# Patient Record
(demographics unavailable — no encounter records)

---

## 2019-10-07 NOTE — PRG
DATE OF SERVICE:  10/07/2019



SUBJECTIVE:  Ms. Eleanor Treadwell is a very pleasant 50-year-old, who presents to the

Wound Center for evaluation of an ulceration of the plantar surface of the right

midfoot.  The patient also has an ulceration over the right medial ankle.  The

patient previously stated that she had been followed by Dr. Carroll for the ulceration

over the plantar surface of the right midfoot.  The patient's medical history is

significant for Charcot arthropathy.  The patient stated that she was prescribed a

boot, which may have led to the formation of an abscess over the right medial ankle.

 The patient stated that she was admitted to [facility] on

08/11/2019 for treatment of erythema and edema of her right foot.  She stated that

during her hospital stay, the abscess of the right medial ankle was opened at

bedside by Dr. Owens in lieu of Dr. Carroll.  The patient stated that before her

admission to [facility], she had been referred to the [facility].  The patient stated that previously the ulceration of the

plantar surface of the right midfoot had been treated with dressing changes of

Promogran.  After being seen in the Wound Center, the patient was placed on dressing

changes of Xeroform gauze for both right foot ulcerations.  The patient states that

she has been utilizing a knee scooter for offloading of the ulceration of the

plantar surface of the right mid foot.  She states that she also began utilizing an

offloading boot in order to try to prepare for returning to work.  The patient

states she noted a worsening in the appearance of the right medial foot ulceration

after utilizing her offloading boot. 



OBJECTIVE:  VITAL SIGNS:  Temperature 98.3, pulse 74, respirations 16, and blood

pressure 146/65.  Accu-Chek 231. 

EXTREMITIES:  An ulceration over the plantar surface of the right mid foot is

present, which measures approximately 2.4 x 2.0 cm.  The dimensions of the wound at

the time of the patient's last visit were approximately 2.1 x 2.4 cm.  An ulceration

over the right medial ankle is present, which measures approximately 1.1 x 0.8 cm.

The dimensions of the wound at the time of the patient's last visit were

approximately 1.9 x 1.9 cm.  Granulation tissue is present within the margins of the

plantar wound.  Nonviable tissue present within the wound margins was debrided with

an excisional full-thickness debridement with the use of scissors.  Undermining and

desiccated tissue at the periphery of the plantar wound were eliminated with the use

of scissors.  No purulent drainage is associated with the wound.  No erythema of the

skin surrounding the wound is present.  No maceration of the skin of the periwound

is noted.  Bruising of the periwound of the right medial ankle wound is present.  In

addition, the wound bed is dusky on exam today. 



ASSESSMENT AND PLAN:  

1. Ulcerations of right foot as described above.  Dressing changes of Xeroform gauze

followed by 4x4s and Kerlix will be continued on a daily basis after cleansing and

irrigation.  The patient has been told that she may utilize an ACE bandage as needed

at the time of dressing changes.  Again, the patient has been instructed as to the

importance of offloading in achieving the healing of her plantar ulceration.  The

patient states she has a followup appointment with Dr. Carroll tomorrow.  I will see

Ms. Treadwell again in 2 weeks. 

2. Diabetes mellitus.  The patient's Accu-Chek in clinic today is 231.  The patient

has been reminded that for optimal wound healing her blood glucoses should remain

below 150. 

3. Hypertension.

4. Charcot arthropathy.







Job ID:  [id number]

## 2019-10-22 NOTE — MRI
MRI RIGHT HINDFOOT WITH AND WITHOUT IV CONTRAST:

10/22/19

 

PROVIDED CLINICAL HISTORY:  

Diabetic ulcers.

 

FINDINGS: 

Correlation is made with radiographs dated 9/5/19 and prior MRI dated 8/12/19. Neuropathic arthropath
y involving the Chopart joint with plantar dislocation of the talus is redemonstrated. There is patch
y periarticular signal alteration on fluid sensitive sequences and demonstrating corresponding enhanc
ement involving the osseous structures of the hindfoot. 

 

There is no localized signal abnormality within the plantar aspects of the plantarly dislocated talar
 head subjacent to the area of wound at the plantar aspect of the midfoot/hindfoot junction. Similarl
y, there is no focal signal abnormality evident in the medial malleolus subjacent to the area of woun
d. 

 

There is extensive nonenhancing tissue at the plantar aspect of the midfoot/hindfoot junction compati
ble with tissue devitalization. 

 

There is enhancing fluid signal intensity noted at the pseudoarticulation between the anterior aspect
 of the tibia and the adjacent fragmented navicular. This could reflect an infected pseudarthrosis. 

 

There is no additional soft tissue fluid collection evident. There is greater than physiologic fluid 
surrounding the flexor digitorum longus and peroneal tendon with evidence for enhancement involving t
he flexor digitorum longus tendon fluid at the level of the distal tibia. 

 

IMPRESSION: 

1.      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
s previously described. No definite signal abnormality to suggest osteomyelitis. 

2.      Enhancing fluid is interposed the junction of the anterior distal tibia and adjacent fragment
ed navicular, possibly reflecting infected pseudoarthrosis. 

3.      Enhancing flexor digitorum longus tenosynovial fluid that may reflect infectious tenosynoviti
s. 

 

POS: OFF

## 2019-10-27 NOTE — CT
CT LUMBAR SPINE WITHOUT CONTRAST:

 

Date:  10/27/19 

 

HISTORY:  

Low back pain, fall yesterday. 

 

FINDINGS/IMPRESSION: 

Multilevel degenerative changes are present without evidence of acute fracture, subluxation, or pars 
articularis defects. 

 

 

POS: AUDREY

## 2019-11-04 NOTE — HP
REASON FOR ADMISSION:  Right foot osteomyelitis.



HISTORY OF PRESENTING ILLNESS:  The patient gives history of having new sore on 
the

right mid foot area on the plantar aspect from last 1 week.  She had gone to 
see Dr. Roland in Wound Care.  She was asked to go see Dr. Espino.  After seeing Dr. Espino,

the patient was asked to get hospitalized for possible osteomyelitis.  She has 
had

 yellowish discharge from the wound.  She also has had a blister on the right

big toe from last 2 weeks, which is slowly healing per the patient.  No fever at

home.  She has been nonweightbearing on the right foot at home. 



PAST MEDICAL AND SURGICAL HISTORY:  Diabetes mellitus type 2, obesity with BMI 
of

52, tubal ligation, cholecystectomy, left great toe amputation, hypertension,

Charcot joint on the right foot. 



CURRENT MEDICATIONS:  The patient is on;

1. Glipizide 10 mg twice daily.

2. Levemir 30 units subcu twice daily.

3. Lisinopril 5 mg daily.

4. Metformin 1000 mg twice daily.



ALLERGIES:  ALLERGIC TO LATEX.



PERSONAL HISTORY:  Does not abuse alcohol or drugs.  No history of smoking.



FAMILY HISTORY:  Mother  at the age of 47 years, she had motor vehicle 
accident,

she also had history of diabetes.  Father  at the age of 73 years, he has 
had

history of CVA, dysphagia, and PEG tube and had multiple complications from 
stroke. 



CODE STATUS:  Full.



POWER OF :  Her son, Mr. Saxena.



REVIEW OF SYSTEMS:  CONSTITUTIONAL:  Negative for weight loss or gain, ability 
to

conduct usual activities. 

SKIN:  Negative for rash, itching. 

EYES:  Negative for double vision, pain. 

ENT/MOUTH:  Negative for nose bleeding, neck stiffness, pain, tenderness. 

CARDIOVASCULAR:  Negative for palpitations, dyspnea on exertion, orthopnea. 

RESPIRATORY:  Negative for shortness of breath, wheezing, cough, hemoptysis, 
fever

or night sweats. 

GASTROINTESTINAL:  Negative for poor appetite, abdominal pain, heartburn, nausea
,

vomiting, constipation, or diarrhea. 

GENITOURINARY:  Negative for urgency, frequency, dysuria, nocturia. 

MUSCULOSKELETAL:  Negative for pain, swelling. 

NEUROLOGIC/PSYCHIATRIC:  Negative for anxiety, depression. 

ALLERGY/IMMUNOLOGIC:  Negative for skin rash, bleeding tendency.



PHYSICAL EXAMINATION:

GENERAL:  The patient is a 50-year-old female, who is currently not in any acute

distress. 

VITAL SIGNS:  Blood pressure 125/76, pulse 102 per minute, respiratory rate 20 
per

minute, temperature 99.6 degrees Fahrenheit, saturating 95% on room air. 

NECK:  Supple.  No elevated JVD. 

HEENT:  Eyes; extraocular muscles intact.  Pupils are reacting to light.  Oral

cavity, mucous membranes are moist.  No exudates or congestion. 

CARDIOVASCULAR:  S1 and S2 heard.  Regular rhythm. 

RESPIRATORY:  Air entry 2+ bilateral.  No rales or rhonchi. 

ABDOMEN:  Soft.  Bowel sounds heard.  No tenderness, rigidity, or guarding. 

EXTREMITIES:  Right foot on the plantar aspect has a deep ulcer, measuring 3 cm 
x 3

cm.  She also has an ulcer on the right medial foot, measuring 1.5 x 1.5 cm.

Peripheral pulses are 1+ bilateral.  No other ischemic ulcers or gangrene seen. 

CENTRAL NERVOUS SYSTEM:  No gross focal deficits noted.  The patient moves all 4

extremities. 

PSYCHIATRIC:  The patient's mood is euthymic.  No hallucinations or delusions.



LABORATORY DATA:  White count of 10, hemoglobin and hematocrit are 10 and 31,

platelet count is 537, MCV is 80 with 71% neutrophils.  PT, INR, and PTT within

normal limits.  BUN 11, creatinine 1.2, serum glucose 510 on arrival.  Albumin 
is

3.2.  Liver enzymes within normal limits.  .  CRP 5.6.  Sedimentation 
rate is

97.  Chest x-ray done shows no focal consolidation.  X-ray right foot, 3-view 
done,

shows stable appearance of right foot with neuropathic/Charcot joint involving 
the

mid and hindfoot with persistent pes planus deformity and persistent areas of

osseous irregularity. 



CLINICAL IMPRESSION AND PLAN:  The patient will be admitted to medical floor for

right foot ulcers x2 with likely osteomyelitis.  We will obtain consultation 
with

Dr. Espino and also Dr. Carroll, her podiatrist.  Wound cultures will be obtained.
  She

will be placed on cefepime, vancomycin, and Flagyl for now.  The patient has

uncontrolled diabetes and will continue her Glucotrol, metformin, and Lantus.  
Wound

Care will be consulted for the wounds.  We will continue to closely monitor her 
on

medical floor.  The patient appears to have chronic anemia.  We will obtain iron

indices.  We will also obtain arterial Doppler to rule out peripheral vascular

disease with long-standing diabetes. 







Job ID:  [id number]



[facility]

## 2019-11-04 NOTE — RAD
Exam:

XR Foot Rt 3 View STANDARD



HISTORY:

Infection to right foot. Possible osteomyelitis.



COMPARISON:

9/5/2019.



FINDINGS:

As noted on the prior examination, there is subcutaneous emphysema as well as soft tissue swelling se
en involving the medial and plantar aspect of the mid foot. Again noted are findings suggestive of

a Charcot type joint involving the hindfoot and midfoot with pes planus deformity again present. Thes
e findings have not significantly changed when compared to the exam on 9/5/2019. Osseous

irregularity secondary to Charcot joint involving the hindfoot and midfoot is again present. Sclerosi
s of the osseous structures in this region are also present. Given these findings, osteomyelitis

would be difficult to exclude at this site.

No other interval change.



IMPRESSION:



1. Stable appearance of the right foot with neuropathic/Charcot type joint involving the mid and hind
foot with persistent pes planus deformity and persistent areas of osseous irregularity and

sclerosis likely corresponding to Charcot joint. However, given the findings of Charcot type joint, o
steomyelitis would be difficult to exclude based on radiographic evaluation.

2. Subjacent edema and soft tissue swelling at the medial and plantar aspect of the foot likely corre
sponding to patient's known wound in this region. There area of subcutaneous emphysema has

decreased in size compared to the study on 9/5/2019.



Reported By: Jude Bustos 

Electronically Signed:  11/4/2019 1:42 PM

## 2019-11-04 NOTE — RAD
Exam: Chest one view



HISTORY:Infiltrate



Comparison: 8/23/2016



FINDINGS:



Lungs: No masses or consolidation.



Cardiac silhouette: Normal size

Pulmonary vessels: Normal

Pleural Spaces: Clear

Pneumothorax: None



Osseous abnormalities: None of acuity.



IMPRESSION: No focal consolidation.



Reported By: Gonzalo Villareal 

Electronically Signed:  11/4/2019 1:36 PM

## 2019-11-05 NOTE — PDOC.HOSPP
- Subjective


Encounter Date: 11/05/19


Encounter Time: 11:48


Subjective: 





no specific complaints





- Objective


Vital Signs & Weight: 


 Vital Signs (12 hours)











  Temp Pulse Resp BP Pulse Ox


 


 11/05/19 07:56  98.4 F  89  18  138/78  92 L


 


 11/05/19 05:08  98.2 F  79  19  132/80  90 L


 


 11/05/19 00:00  99.0 F  87  19  100/67  92 L








 Weight











Weight                         284 lb 3 oz














Result Diagrams: 


 11/05/19 05:42





 11/05/19 05:42


Additional Labs: 


 Accuchecks











  11/05/19 11/04/19 11/04/19





  04:17 21:31 17:01


 


POC Glucose  254 H  299 H  391 H














Hospitalist ROS





- Medication


Medications: 


Active Medications











Generic Name Dose Route Start Last Admin





  Trade Name Freq  PRN Reason Stop Dose Admin


 


Hydrocodone Bitart/Acetaminophen  1 tab  11/04/19 12:47  11/04/19 22:46





  Norco 5/325  PO   1 tab





  Q4H PRN   Administration





  Moderate Pain (4-6)   





     





     





     


 


Famotidine  20 mg  11/04/19 21:00  11/04/19 21:28





  Pepcid  PO   20 mg





  BID ALVAREZ   Administration





     





     





     





     


 


Glipizide  10 mg  11/04/19 16:30  11/04/19 17:18





  Glucotrol  PO   10 mg





  BID-AC ALVAREZ   Administration





     





     





     





     


 


Cefepime HCl 1 gm/ Sodium  100 mls @ 200 mls/hr  11/04/19 21:00  11/04/19 21:29





  Chloride  IVPB   100 mls





  Q12HR ALVAREZ   Administration





     





     





     





     


 


Metronidazole 500 mg/ Device  100 mls @ 100 mls/hr  11/04/19 22:00  11/05/19 06:

31





  IVPB   100 mls





  Q8HR ALVAREZ   Administration





     





     





     





     


 


Insulin Human Lispro  0 units  11/04/19 12:47  11/05/19 06:31





  Humalog  SC   6 unit





  .MODERATE SLIDING SC PRN   Administration





  Moderate Correctional Scale   





     





     





     


 


Metformin HCl  1,000 mg  11/04/19 17:00  11/04/19 17:17





  Glucophage  PO   1,000 mg





  BID-WM ALVAREZ   Administration





     





     





     





     














- Exam


General Appearance: NAD


Neck: no JVD


Heart: RRR, no murmur


Respiratory: CTAB


Gastrointestinal: soft, non-tender, normal bowel sounds


Extremities: 1+ LE edema


Extremities - other findings: R foot bandaged





Hosp A/P


(1) Foot ulcer


Code(s): L97.509 - NON-PRESSURE CHRONIC ULCER OTH PRT UNSP FOOT W UNSP SEVERITY

   Status: Acute   


Qualifiers: 


   Laterality: right   Non-pressure ulcer stage: unspecified non-pressure ulcer 

stage   Qualified Code(s): L97.519 - Non-pressure chronic ulcer of other part 

of right foot with unspecified severity   





(2) Diabetes mellitus


Code(s): E11.9 - TYPE 2 DIABETES MELLITUS WITHOUT COMPLICATIONS   Status: 

Chronic   


Qualifiers: 


   Diabetes mellitus type: type 2   Diabetes mellitus long term insulin use: 

with long term use   Diabetes mellitus complication status: with kidney 

complications   Chronic kidney disease stage: stage 2 (mild) 





(3) Hypertension


Code(s): I10 - ESSENTIAL (PRIMARY) HYPERTENSION   Status: Chronic   


Qualifiers: 


   Hypertension type: essential hypertension 





(4) Charcot's joint


Code(s): M14.60 - CHARCOT'S JOINT, UNSPECIFIED SITE   Status: Chronic   





- Plan





cont antibx, wound care


MRI R foot pending


accu/ss/ etc

## 2019-11-05 NOTE — CON
DATE OF CONSULTATION:  



SUBJECTIVE:  The patient is resting comfortably, sitting at bedside eating lunch.

The patient is well known to the office as I have been taking care of her for

diabetic foot ulceration, right foot secondary to Charcot deformity.  The patient

has currently been seen by Dr. Espino and had wound care on [facility].  The

patient was admitted yesterday for concerns of cellulitis and possible

osteomyelitis.  The patient states she is feeling well. 



OBJECTIVE:  Examination shows the patient's foot wounds to be approximately the same

size as last visit.  Wounds are clean and showed good granular tissue.  A new wound

is seen at the medial aspect of the foot and when inspected, it tracks to the

plantar wound.  There was some necrotic tissue seen medially, which was debrided at

the bedside. 



Angiogram performed showed good potential for healing. 





Upon bedside debridement, no tracking to bone was noted.  No exposed bone noted.  No

drainage noted. 



Cultures have been taken.



ASSESSMENT AND PLAN:  

1. The patient had a negative MRI on October 22nd.  A new MRI was ordered for

possible osteomyelitis at the medial Charcot foot deformity and ulcer site. 

2. Bedside debridement performed.  The tracking was packed and sterile dressings

applied.  Wound care orders were written. 

3. If the MRI is negative, continue wound care and antibiotic therapy.  If MRI is

positive for osteomyelitis, we will most likely need an orthopedic consult for the

level of this possible osteomyelitis.  There is concern for Charcot arthropathy and

any bone procedures being performed as the Charcot arthropathy may be activating the

destructive phase again.  I will continue to follow. 







Job ID:  [id number]

## 2019-11-05 NOTE — ULT
LOWER EXTREMITY ARTERIAL EVALUATION USING DOPPLER WAVEFORM ANALYSIS AND SEGMENTAL LIMB PRESSURES WAS 
PERFORMED

11/5/19

 

The patient's history includes diabetes mellitus and an ulcer on the right foot. History of left grea
t toe amputation. 

 

Looking at the Doppler waveforms, they are relatively well preserved in both lower extremities, parti
cularly in the distal leg. Ankle-arm index cannot be calculated due to noncompressible calcified ve
ssels. Toe-brachial index on the right is 0.75 which is normal. Toe-brachial index on the left could 
not be performed due to absence of the left great toe. 

 

ASSESSMENT:

This study demonstrates normal resting arterial perfusion of the lower extremities and would suggest 
that the patient has healing potential in the foot in terms of circulatory status.

## 2019-11-05 NOTE — PRG
DATE OF SERVICE:  11/05/2019



SUBJECTIVE:  Ms. Treadwell, I had seen her in the office and I admitted her from the

office because of changes in her right foot Charcot arthropathy site and skin

ulcerations.  She basically had an opening of third ulcerated area which seems to

fistulous communication with the mid-foot region deeper aspect.  There was a

purulent drainage and inflammatory changes noted as well as a leukocytosis in the

CBC.  So the main concern was with complication of the original process with

possible development of midfoot infection of a more serious nature.  She had

reported no fever.  No respiratory symptoms.  No abdominal pain or diarrhea.  No

genitourinary symptoms. 



PAST MEDICAL HISTORY:  Noted included type 2 diabetes with neuropathy, Charcot

arthropathy right side, chronic right foot ulcer, dyslipidemia, hypertension,

osteomyelitis, left great toe with amputation, obesity, hypertension. 



PAST SURGICAL HISTORY:  She also had cholecystectomy and tubal ligation.



ALLERGIES:  NONE.



SOCIAL HISTORY:  She is a current smoker.  She works as a convenience store in

[place].  No alcoholic beverage use.  Lives in [place] with family. 



FAMILY HISTORY:  Diabetes.



CURRENT MEDICATIONS:  Include,

1. Tylenol.

2. Norco.

3. Cefepime.

4. Flagyl.

5. Vancomycin.



PHYSICAL EXAMINATION:

VITAL SIGNS:  T-max 99.6.  She is now 98.1. 

SKIN:  Those ulcerated areas medial aspect of the right foot starting at the

mid-foot and going toward the hindfoot region.  The new one is the one in the middle

with purulent exudate.  There is some mild to moderate erythema surrounding those

areas. 

HEENT:  Ocular movements are conjugate.  Oral cavity with quite a few teeth missing. 

NECK:  Supple. 

LUNGS:  Symmetric.  Clear breath sounds. 

HEART:  S1 and S2, regular rate. 

ABDOMEN:  Soft.  Not distended or tender.  No ascites.  No bladder distention. 

EXTREMITIES:  No joint inflammatory activity outside the area of involvement.



LABORATORY DATA:  Here this admission, white cell count 10.6, hemoglobin 9.8,

platelets 537 with a normal differential.  Sodium 137, creatinine 0.98, iron 23.

Liver profile normal.  CRP 5.6, albumin 3.2, globulin 4.6.  We have some culture

from the exudate obtained with rare gram-positive cocci with a pending final

identification susceptibility testing.  She had a repeat foot x-ray, which showed

Charcot arthropathy, some erosions.  An MRI is pending.  Her arterial ultrasound

showed adequate healing, potential. 



ASSESSMENT:  Diabetes type 2, Charcot arthropathy, now with worrisome development of

a second area of ulceration with possible fistulous communication with deeper

structures.  A surgical consultation has been placed.  I believe Dr. Carroll has seen

the patient and is going to do some I and D, and repeat MRI has been ordered as

well.  Once we get the results, I think my bias is to treat her with IV

antimicrobial therapy this time guided by the culture results and given via PICC

line. 







Job ID:  [id number]

## 2019-11-06 NOTE — PRG
DATE OF SERVICE:  11/06/2019



The patient's MRI results were in.  There was no osteomyelitis noted on MRI.  At

this time, continued wound care and antibiotic therapy is recommended.  The patient

will continue to follow up with me on an outpatient basis. 







Job ID:  [id number]

## 2019-11-06 NOTE — PRG
DATE OF SERVICE:  11/06/2019



SUBJECTIVE:  She had I and D by Dr. Carroll, and he did not see a deep process.  The

MRI showed some involvement of the fascia with some gas there.  She denies any major

symptoms at this time remembering that she has neuropathy, but no dyspnea.  No

abdominal pain.  No diarrhea. 



OBJECTIVE:  VITAL SIGNS:  She has been afebrile.  Blood pressure 140/88. 

LUNGS:  Clear.  PICC line has been inserted. 

HEART:  S1 and S2.  Regular rate. 

ABDOMEN:  Soft, not distended.  Second ulcer that developed now appears clean with a

red base. 



LABORATORY DATA:  White cell count is 9.4, hemoglobin 9.1, platelets 477, 82%

neutrophils and creatinine is down to 0.98.  Urine with group B strep from October.

November 4th sample from the foot with Enterococcus species.  She had an arterial

ultrasound, which showed adequate blood supply. 



ASSESSMENT AND DISCUSSION:  Type 2 diabetes, Charcot arthropathy, persistent

ulcerations with a new one developing some connection to the fascia in the plantar

area.  At this time, we will switch her to IV Invanz and daptomycin in the

outpatient setting daily through the PICC line and treat for about 3 to 4 weeks

since the MRI was not conclusive for osteomyelitis.  May extend therapy depending on

the progress of the area and CRP. 







Job ID:  [id number]

## 2019-11-06 NOTE — MRI
MRI Lower Ext Jt Rt WO Con



History: Mid foot ulcer



Comparison: Radiograph prior day



Findings: There is severe midfoot and hindfoot degenerative changes with Charcot arthropathy. There i
s dorsal displacement of the navicular at the talonavicular joint with downward angulation of the

talar head which does not articulate with the navicular. There is a lateral-impingement hindfoot valg
us.



Erosions of the anterior margin of the tibial plafond due to abnormal articulation with the navicular
. Stress edema of the calcaneus.



No abnormal focal area of T1 signal loss to suggest osteomyelitis.



Soft tissues: A large plantar wound with soft tissue gas on the plantar medial soft tissues. This is 
at the level of the midfoot. Gas is seen extending to the depth of the plantar fascia. There is gas

extending between the abductor houses and flexor digitorum tendons.



Muscles: Complete muscle atrophy.



Impression: 

1. Charcot arthropathy of the hindfoot with talonavicular dislocation and hindfoot valgus. No evidenc
e for osteomyelitis.

2. Large plantar medial wound with soft tissue gas extending between the plantar fascia of the flexor
 digitorum and abductor hallucis tendons.

3. Severe synovitis and degenerative changes of the hindfoot with large effusion. Although the planta
r wound comes close to the hindfoot, and there are erosions, these are felt to be neuropathic in

nature and not due to osteomyelitis.



Reported By: Rashi Bedolla 

Electronically Signed:  11/6/2019 8:15 AM

## 2019-11-06 NOTE — PDOC.HOSPP
- Subjective


Encounter Date: 11/06/19


Encounter Time: 09:59


Subjective: 





i'm fine





- Objective


Vital Signs & Weight: 


 Vital Signs (12 hours)











  Temp Pulse Resp BP BP Pulse Ox


 


 11/06/19 08:10  98.1 F  85  18   140/88  96


 


 11/06/19 07:43   95   130/74  








 Weight











Admit Weight                   284 lb 3 oz


 


Weight                         284 lb 3 oz














I&O: 


 











 11/05/19 11/06/19 11/07/19





 06:59 06:59 06:59


 


Intake Total  1030 


 


Balance  1030 











Result Diagrams: 


 11/05/19 05:42





 11/05/19 05:42


Additional Labs: 


 Accuchecks











  11/06/19 11/05/19 11/05/19





  03:49 19:04 17:08


 


POC Glucose  158 H  278 H  298 H














  11/05/19





  11:48


 


POC Glucose  297 H











Radiology Reviewed by me: Yes (mri foot- no osteomyelitis)





Hospitalist ROS





- Medication


Medications: 


Active Medications











Generic Name Dose Route Start Last Admin





  Trade Name Freq  PRN Reason Stop Dose Admin


 


Hydrocodone Bitart/Acetaminophen  1 tab  11/04/19 12:47  11/05/19 22:59





  [place] 5/325  PO   1 tab





  Q4H PRN   Administration





  Moderate Pain (4-6)   





     





     





     


 


Enoxaparin Sodium  40 mg  11/05/19 09:00  11/06/19 07:45





  Lovenox  SC   40 mg





  0900 ALVAREZ   Administration





     





     





     





     


 


Famotidine  20 mg  11/04/19 21:00  11/06/19 07:43





  Pepcid  PO   20 mg





  BID ALVAREZ   Administration





     





     





     





     


 


Glipizide  10 mg  11/04/19 16:30  11/06/19 07:44





  Glucotrol  PO   10 mg





  BID-AC ALVAREZ   Administration





     





     





     





     


 


Cefepime HCl 1 gm/ Sodium  100 mls @ 200 mls/hr  11/04/19 21:00  11/06/19 07:45





  Chloride  IVPB   100 mls





  Q12HR ALVAREZ   Administration





     





     





     





     


 


Metronidazole 500 mg/ Device  100 mls @ 100 mls/hr  11/04/19 22:00  11/06/19 05:

20





  IVPB   100 mls





  Q8HR ALVAREZ   Administration





     





     





     





     


 


Insulin Glargine 30 units/  0.3 mls @ 0 mls/hr  11/05/19 21:00  11/05/19 20:51





  Miscellaneous Medication  SC   0.3 mls





  HS ALVAREZ   Administration





     





     





     





     


 


Vancomycin HCl 1 gm/ Device  200 mls @ 200 mls/hr  11/05/19 21:00  11/05/19 21:

29





  IVPB   200 mls





  BID ALVAREZ   Administration





     





     





     





     


 


Insulin Human Lispro  0 units  11/04/19 12:47  11/05/19 17:17





  Humalog  SC   6 unit





  .MODERATE SLIDING SC PRN   Administration





  Moderate Correctional Scale   





     





     





     


 


Insulin Human Lispro  0 units  11/04/19 12:47  11/05/19 20:52





  Humalog  SC   3 unit





  .BEDTIME SLIDING SC PRN   Administration





  Bedtime Correctional Scale   





     





     





     


 


Lisinopril  5 mg  11/05/19 09:00  11/06/19 07:43





  Zestril  PO   5 mg





  DAILY ALVAREZ   Administration





     





     





     





     


 


Metformin HCl  1,000 mg  11/04/19 17:00  11/06/19 07:44





  Glucophage  PO   1,000 mg





  BID-WM ALVAREZ   Administration





     





     





     





     














- Exam


General Appearance: awake alert


Neck: no JVD


Heart: RRR, no murmur


Respiratory: CTAB


Gastrointestinal: soft, normal bowel sounds


Extremities: 1+ LE edema





Hosp A/P


(1) Foot ulcer


Code(s): L97.509 - NON-PRESSURE CHRONIC ULCER OTH PRT UNSP FOOT W UNSP SEVERITY

   Status: Acute   


Qualifiers: 


   Laterality: right   Non-pressure ulcer stage: unspecified non-pressure ulcer 

stage   Qualified Code(s): L97.519 - Non-pressure chronic ulcer of other part 

of right foot with unspecified severity   





(2) Diabetes mellitus


Code(s): E11.9 - TYPE 2 DIABETES MELLITUS WITHOUT COMPLICATIONS   Status: 

Chronic   


Qualifiers: 


   Diabetes mellitus type: type 2   Diabetes mellitus long term insulin use: 

with long term use   Diabetes mellitus complication status: with kidney 

complications   Chronic kidney disease stage: stage 2 (mild) 





(3) Hypertension


Code(s): I10 - ESSENTIAL (PRIMARY) HYPERTENSION   Status: Chronic   


Qualifiers: 


   Hypertension type: essential hypertension 





(4) Charcot's joint


Code(s): M14.60 - CHARCOT'S JOINT, UNSPECIFIED SITE   Status: Chronic   





- Plan


ID recommends long term iv antibx, willder PICC line


await decision on more surgery

## 2019-11-06 NOTE — SPC
Ultrasound and Fluoroscopic guided left upper extremity single lumen PICC placement:

3/26/2019



HISTORY: Need for central vascular access.



FINDINGS: Informed consent obtained prior to the procedure.



Left antecubital fossa prepped and draped in normal sterile fashion.



Skin overlying the basilic vein anesthetized with 1% buffered lidocaine. With direct sonographic guid
ance, vascular access is obtained via the basilic vein and an 0.018in wire was advanced to the

cavoatrial junction. Intravascular length is calculated at 43.5 cm and of the PICC is cut accordingly
.



Needle is removed and replaced with a peel-away sheath.



The PICC was advanced over the wire. Wire and peel-away sheath were removed. The tip of the catheter 
overlies the cavoatrial junction. The port flushes well and the catheter is ready for use.



Exposure data:

0 minutes of fluoroscopic time

2 mGy per centimeter squared



IMPRESSION: Successful ultrasound guided placement of a left upper extremity PICC.



Reported By: Gonzalo Villareal 

Electronically Signed:  11/6/2019 1:58 PM

## 2019-11-07 NOTE — PDOC.HOSPP
- Subjective


Encounter Date: 11/07/19


Encounter Time: 10:06


Subjective: 





only complaint is chronic back pain





- Objective


Vital Signs & Weight: 


 Vital Signs (12 hours)











  Temp Pulse Resp BP Pulse Ox


 


 11/07/19 08:09  98.0 F  94  18  147/86 H  92 L


 


 11/07/19 08:04   86   








 Weight











Admit Weight                   284 lb 3 oz


 


Weight                         284 lb 3 oz














I&O: 


 











 11/06/19 11/07/19 11/08/19





 06:59 06:59 06:59


 


Intake Total 1030 940 300


 


Balance 1030 940 300











Result Diagrams: 


 11/05/19 05:42





 11/05/19 05:42


Additional Labs: 


 Accuchecks











  11/07/19 11/06/19 11/06/19





  04:58 19:44 17:04


 


POC Glucose  167 H  207 H  245 H














  11/06/19





  12:01


 


POC Glucose  193 H














Hospitalist ROS





- Medication


Medications: 


Active Medications











Generic Name Dose Route Start Last Admin





  Trade Name Freq  PRN Reason Stop Dose Admin


 


Hydrocodone Bitart/Acetaminophen  1 tab  11/04/19 12:47  11/07/19 08:54





  [place] 5/325  PO   1 tab





  Q4H PRN   Administration





  Moderate Pain (4-6)   





     





     





     


 


Enoxaparin Sodium  40 mg  11/05/19 09:00  11/07/19 08:03





  Lovenox  SC   40 mg





  0900 ALVAREZ   Administration





     





     





     





     


 


Famotidine  20 mg  11/04/19 21:00  11/07/19 08:03





  Pepcid  PO   20 mg





  BID ALVAREZ   Administration





     





     





     





     


 


Glipizide  10 mg  11/04/19 16:30  11/07/19 08:02





  Glucotrol  PO   10 mg





  BID-AC ALVAREZ   Administration





     





     





     





     


 


Cefepime HCl 1 gm/ Sodium  100 mls @ 200 mls/hr  11/04/19 21:00  11/07/19 08:02





  Chloride  IVPB   100 mls





  Q12HR ALVAREZ   Administration





     





     





     





     


 


Metronidazole 500 mg/ Device  100 mls @ 100 mls/hr  11/04/19 22:00  11/07/19 05:

52





  IVPB   100 mls





  Q8HR ALVAREZ   Administration





     





     





     





     


 


Insulin Glargine 30 units/  0.3 mls @ 0 mls/hr  11/05/19 21:00  11/06/19 20:58





  Miscellaneous Medication  SC   0.3 mls





  HS ALVAREZ   Administration





     





     





     





     


 


Insulin Glargine 30 units/  0.3 mls @ 0 mls/hr  11/06/19 09:00  11/07/19 08:04





  Miscellaneous Medication  SC   0.3 mls





  QAM ALVAREZ   Administration





     





     





     





     


 


Vancomycin HCl 1 gm/ Device  200 mls @ 200 mls/hr  11/05/19 21:00  11/07/19 08:

04





  IVPB   200 mls





  BID ALVAREZ   Administration





     





     





     





     


 


Insulin Human Lispro  0 units  11/04/19 12:47  11/07/19 05:52





  Humalog  SC   2 unit





  .MODERATE SLIDING SC PRN   Administration





  Moderate Correctional Scale   





     





     





     


 


Insulin Human Lispro  0 units  11/04/19 12:47  11/05/19 20:52





  Humalog  SC   3 unit





  .BEDTIME SLIDING SC PRN   Administration





  Bedtime Correctional Scale   





     





     





     


 


Lisinopril  5 mg  11/05/19 09:00  11/07/19 08:04





  Zestril  PO   5 mg





  DAILY ALVAREZ   Administration





     





     





     





     


 


Metformin HCl  1,000 mg  11/04/19 17:00  11/07/19 08:02





  Glucophage  PO   1,000 mg





  BID-WM ALVAREZ   Administration





     





     





     





     














- Exam


Neck: no JVD


Heart: RRR, no murmur


Respiratory: CTAB


Gastrointestinal: soft, normal bowel sounds


Extremities: no edema


Extremities - other findings: R foot bandaged





Hosp A/P


(1) Foot ulcer


Code(s): L97.509 - NON-PRESSURE CHRONIC ULCER OTH PRT UNSP FOOT W UNSP SEVERITY

   Status: Acute   


Qualifiers: 


   Laterality: right   Non-pressure ulcer stage: unspecified non-pressure ulcer 

stage   Qualified Code(s): L97.519 - Non-pressure chronic ulcer of other part 

of right foot with unspecified severity   





(2) Diabetes mellitus


Code(s): E11.9 - TYPE 2 DIABETES MELLITUS WITHOUT COMPLICATIONS   Status: 

Chronic   


Qualifiers: 


   Diabetes mellitus type: type 2   Diabetes mellitus long term insulin use: 

with long term use   Chronic kidney disease stage: stage 2 (mild) 





(3) Hypertension


Code(s): I10 - ESSENTIAL (PRIMARY) HYPERTENSION   Status: Chronic   


Qualifiers: 


   Hypertension type: essential hypertension 





(4) Charcot's joint


Code(s): M14.60 - CHARCOT'S JOINT, UNSPECIFIED SITE   Status: Chronic   





- Plan


awaiting approval of outpt wound tx and iv antibx


cont current care pending above

## 2019-11-08 NOTE — DIS
DATE OF ADMISSION:  11/04/2019



DATE OF DISCHARGE:  11/08/2019



REASON FOR HOSPITALIZATION:  Lower extremity wound.



SIGNIFICANT FINDINGS:  The patient found to have diabetic foot wound of the right

foot on MRI that was not consistent with osteomyelitis. 



PROCEDURES PERFORMED AND TREATMENTS RENDERED:  The patient was admitted to medical

unit for maximum medical therapy.  Please see full history and physical in addition

to progress notes from internal medicine physician for details.  The patient was

seen and evaluated by Podiatry, please see full consultation and progress notes for

details.  The patient was seen and evaluated by Infectious Disease specialist,

please see full consultation and progress notes for details.  With maximum medical

therapy, the patient was recommended safe for discharge by all specialists on

11/08/2019 with IV antibiotics.  Efforts were made with Case Management to set up

the patient for outpatient antibiotics to be infused at the [facility].

The patient recommended to continue outpatient wound care as she has been doing

before.  Efforts were made with Case Management and nursing staff to give the

patient a diabetic offloading shoe. 



CONDITION ON DISCHARGE:  Stable.



SPECIFIC INSTRUCTIONS FOR THE PATIENT/FAMILY:  

1. The patient is recommended to have IV antibiotics infused as directed by

Infectious Disease specialist. 

2. The patient is recommended to follow up with Infectious Disease specialist in the

next 1 to 2 weeks. 

3. The patient is recommended to follow up with Podiatry in the next 1 to 2 weeks.

4. The patient is recommended follow up with primary care physician in the next 1 to

2 weeks. 

5. The patient is recommended to continue all home medications as directed.

6. The patient is recommended to continue to follow up with wound care in the

outpatient clinic as she has been doing before. 

7. The patient is recommended to return to [facility] immediately if signs

or symptoms return, worsen, or any other new symptoms occur. 



DISCHARGE MEDICATIONS:  

1. Glipizide 10 mg one tablet p.o. b.i.d.

2. Insulin Levemir 30 units subcutaneous injection b.i.d.

3. Metformin 1000 mg one tablet p.o. b.i.d.

4. Lisinopril 5 mg one tablet p.o. daily.

5. Invanz 1 g IV piggyback q.24 hours.

6. Daptomycin 500 mg IV daily.



HOSPITAL COURSE:  Ms. Treadwell is a very pleasant 50-year-old female with past

medical history of uncontrolled insulin-dependent diabetes mellitus, hypertension,

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

left foot, who presents with right diabetic foot wound on 11/04/2019.  The patient

had MRI of the lower extremity - please see full report for details - there was no

definitive osteomyelitis identified.  However, there is Charcot arthropathy of the

hind foot with talonavicular dislocation of the hind foot valgus.  There is a large

plantar medial wound with soft-tissue gas extending between the plantar fascia and

the flexor digitorum and abductor hallucis tendons.  Please see full MRI results for

details.  The patient was seen and evaluated by Podiatry, who recommended no acute

inpatient surgical treatment.  The patient was recommended safe for discharge by all

specialists on 11/08/2019.  Infectious Disease specialist consulted - please see

full consultation and progress notes for details.  Infectious Disease recommending

IV antibiotics with an extended spectrum coverage for a prolonged course for

resolution of diabetic foot wound.  Efforts were made with Case Management to set up

these antibiotics in the acute care setting, to be infused in the outpatient setting

through the hospital.  This was completed on 11/08/2019.  Efforts were made with

Case Management and nursing staff to get the patient an offloading diabetic shoe to

prevent worsening of diabetic foot wound.  This was coordinated with Physical

Therapy for recommendations.  The patient recommended to continue outpatient wound

care as she has been doing in the outpatient clinic.  The patient is recommended to

follow up with Infectious Disease specialist in the next 1 to 2 weeks.  The patient

is recommended to follow up with Podiatry in the next 1 to 2 weeks.  The patient is

recommended to follow up with primary care physician in the next 5 to 7 days.  The

patient is recommended to take all medications as directed, to be re-evaluated by

primary care physician and Infectious Disease.  The patient is recommended to return

to acute care hospital immediately if signs or symptoms return, worsen, or any other

new symptoms occur. 



TIME SPENT:  Greater than 39 minutes spent coordinating care and discharge process

for this patient. 







Job ID:  [id number]

## 2019-11-11 NOTE — PQF
KADIEANTONY PAPPAS                                            HEMA CARROLL [facility]

[id number]                                                             [id number]
[id number]

[id number]                             

                                   

CLINICAL DOCUMENTATION CLARIFICATION FORM:  POST DISCHARGE



Addendum to original discharge summary date:  __________________________________
____



Late entry note date:  _________________________________________________________
__











DATE:  11/11/19                                         ATTN: Hema Carroll





Please exercise your independent, professional judgment in responding to the 
clarification form. 

Clinical indicators are provided on the bottom of this form for your review



Please check appropriate box(s):

[  ] Excisional Debridement: 

[  ] Excised [  ] Cut away  [  ] Other: _________________

Depth / layer: (deepest layer of debridement): 

[  ] Skin[  ] SubQ Tissue     [  ] Fascia      [  ] Muscle     [  ] Tendon     
[  ] Bone

             Appearance of wound: (e.g., down to fresh bleeding tissue, etc.)___
____________________

Margins: (please specify): _________ / _____ x _____ x _____

Instruments used:  [  ] Scissors    [  ] Scalpel   [  ] Curette 

 [  ] Soft tissue clipper         [  ] Other: _______________                  
                                                                               
                                                                               
                                                                               
                                  

[  ] Non-excisional Debridement: (Removal by flushing, brushing, chemical, or 
washing)

                Depth / layer: (deepest layer of debridement):

     [  ] Skin[  ] Subcutaneous     [  ] Fascia      [  ] Muscle     [  ] 
Tendon     [  ] Bone



[  ] Incision and Drainage only (No Debridement):  

         Depth:[  ] Skin    [  ]  Subcutaneous  [  ] Fascia  [  ] Muscle    [  
] Tendon [  ] Bone

[  ] Other procedure diagnosis ___________ 

[  ] Unable to determine



For continuity of documentation, please document condition throughout progress 
notes and discharge summary.  Thank You.



CLINICAL INDICATORS - SIGNS / SYMPTOMS / LABS

Consult 11/05 "Bedside debridement performed"

Consult 11/05 "Wounds are clean and showed good granular tissue"

Consult 11/05 "There are some necrotic tissue seen medially, which was debrided 
at the bedside"

Consult 11/05 "Upon bedside debridment, no tracking to bone was noted, no 
exposed bobe noted. No drainage noted"





RISK FACTORS

HP 11/04-DM

HP 11/04-Obesity

HP 11/04-Foot ulcer





TREATMENTS:

Interventional Procedure 11/06-PICC insertion

Collected 11/04-Foot xray

Consult 11/05-debridement

MAR 11/04-Maxipime 1gm IV

MAR 11/05-Insulin 30 units subcu

MAR 11/05-Vancomycin 1gm IV













(This form is maintained as a part of the permanent medical record)

2015 [employer]  All Rights Reserved

[email or web address]    [not 
provided]

                                                              



 

MTDD

## 2020-01-06 NOTE — MMO
Bilateral MAMMO Bilat Screen DDI.

 

CLINICAL HISTORY:

Patient is 50 years old and is seen for screening. The patient has no family

history of breast cancer.  The patient has no personal history of cancer.

 

VIEWS:

The views performed were:  bilateral craniocaudal and bilateral mediolateral

oblique.

 

FILMS COMPARED:

The present examination has been compared to prior imaging studies performed at

[facility] on 09/29/2014 and 02/10/2016.

 

This study has been interpreted with the assistance of computer-aided detection.

 

MAMMOGRAM FINDINGS:

There are scattered fibroglandular densities.

 

There are stable benign appearing calcifications seen in both breasts.

 

There are no suspicious masses, suspicious calcifications, or new areas of

architectural distortion.

 

IMPRESSION:

THERE IS NO MAMMOGRAPHIC EVIDENCE OF MALIGNANCY.

 

A ROUTINE FOLLOW-UP MAMMOGRAM IN 1 YEAR IS RECOMMENDED.

 

ACR BI-RADS Category 2 - Benign finding

 

MAMMOGRAPHY NOTE:

 1. A negative mammogram report should not delay a biopsy if a dominant of

 clinically suspicious mass is present.

 2. Approximately 10% to 15% of breast cancers are not detected by

 mammography.

 3. Adenosis and dense breasts may obscure an underlying neoplasm.

 

 

Reported by: MAIDA EDMONDS MD

Electonically Signed: [id number]

## 2021-01-18 NOTE — MRI
EXAM:  MRI Lower Ext Jt Rt W WO Con



DATE:  1/18/2021 12:14 PM



INDICATION:  Concern for osteomyelitis and necrotizing fasciitis of the right foot



COMPARISON:  Right foot radiograph dated January 18, 2021 and an MR of the right foot dated November 5, 2019



FINDING:  10 cc of MultiHance was utilized for the examination. Motion artifact limits image detail.



Again seen is prominent Charcot arthropathy involving the hindfoot and midfoot with plantar dislocati
on of the talar head. There is persistent full-thickness ulceration involving the plantar aspect of

the heel, adjacent to the talar head. There is a new peripherally enhancing subcutaneous abscess exte
nding from the ulceration site along the plantar aspect of the foot anteriorly within the midfoot

pad and along the medial aspect of the midfoot. The large plantar base and medial midfoot subcutaneou
s periarticular abscess measures 5.6 x 6.8 x 4.5 cm on image 16 of series 9, image 22 of series 9

and image 25 of series 10. No definite abnormal signal abnormality or enhancement is seen to suggest 
presence of osteomyelitis.





IMPRESSION:

1. Persistent Charcot arthropathy of the right hindfoot and midfoot.

2. Persistent plantar base heel ulceration seen adjacent to the plantar dislocated talar head. There 
is a dissecting subcutaneous abscess extending anterior from this plantar base ulceration into the

plantar, medial and dorsal medial soft tissues of the midfoot consistent with a periarticular abscess
.

3. No definite signal abnormality or abnormal enhancement seen to suggest presence of osteomyelitis.



Reported By: Tye Quinones 

Electronically Signed:  1/18/2021 4:31 PM

## 2021-01-18 NOTE — PDOC.HHP
Hospitalist HPI





- History of Present Illness


Foot ulcer


History of Present Illness: 





Ms. Treadwell is a 51-year-old female with a past medical history of type 2 

diabetes mellitus on insulin, hypertension, Charcot foot who was sent in from 

wound care clinic for a new worsening right foot ulcer.  Patient has a history 

of a chronic diabetic ulcer to the dorsum of her right foot which is treated at 

a wound care center.  Patient reports that last week she developed a new ulcer 

to the bottom of her foot and then an additional ulcer to the dorsal aspect of 

her foot.  She also noticed some increased swelling to her foot along with 

redness.  Patient also reports that she developed fevers at home as high as 102.

 Patient presented to urgent care and was started on Bactrim as an outpatient, 

however her symptoms continue to worsen and she presented to her wound care 

nurse.  Nurse practitioner at wound care center instructed patient to present to

emergency room.  She has no history of peripheral vascular disease.  Patient 

currently denies chest pain, shortness of breath, abdominal pain.  Denies 

numbness weakness or tingling to the extremity.  Endorses pain, swelling, 

subjective fevers.





In emergency room initial vital signs 129/81, 91, 18, 99.1, 99% on room air.  

WBC 11.1.  H/H 10.7/33.4.  Lactic acid 1.3.  BUN/CR 18/1.49.  Glucose 259.  

Chest x-ray showed Charcot foot which is stable from prior as well as a 

prominent subcutaneous soft tissue swelling on the dorsal aspect of the foot, no

obvious signs of osseous destruction or obvious signs of osteomyelitis.  Patient

received Vanco and cefepime in the emergency room.  ER provider also unroofed 

bullae to the dorsum of the foot and sent for wound culture.





Hospitalist ROS





- Review of Systems


Constitutional: reports: fever.  denies: chills, sweats, weakness


Eyes: denies: vision change


ENT: denies: ear pain, ear discharge, nose pain, nose discharge, nose 

congestion, mouth pain, mouth swelling, throat pain, throat swelling, other


Respiratory: denies: cough, dry, shortness of breath, hemoptysis, SOB with 

excertion, pleuritic pain, sputum, wheezing, other


Cardiovascular: denies: chest pain, palpitations, orthopnea, paroxysmal noc. 

dyspnea, edema, light headedness, other


Gastrointestinal: denies: nausea, vomiting, abdominal pain, diarrhea, constipat

ion, melena, hematochezia, other


Genitourinary: denies: dysuria, frequency, incontinence, hematuria, retention, 

other


Musculoskeletal: reports: foot pain.  denies: neck pain, shoulder pain, arm 

pain, back pain, hand pain, leg pain, other


Skin: reports: rash


Neurological: denies: weakness, numbness, incoordination, change in speech, 

confusion, seizures, other





- Medication


Medications: 


Home medications include





Levemir 35 units twice daily


Lisinopril 10 mg daily


Metformin 500 mg twice daily


Glipizide 10 mg twice daily





Allergy to latex





Hospitalist History





- Past Medical History


Other Medical History: 





Past medical history significant for





Type 2 diabetes on insulin


Hypertension


Charcot foot


Diabetic chronic pedal ulcers





- Past Surgical History


Other Surgical History: 





Past surgical history includes tubal ligation


Great toe on left foot amputation


Cholecystectomy





- Family History


Family History: reports: no pertinent history





- Social History


Smoking Status: Never smoker


Alcohol: reports: None


Living Situation: With Family


Activity level: independent ambulation





- Exam


General Appearance: NAD, awake alert


Eye: PERRL, anicteric sclera


ENT: normocephalic atraumatic, no oropharyngeal lesions, moist mucosa


Neck: supple, symmetric, no JVD, no thyromegaly, no lymphadenopathy, no carotid 

bruit


Heart: RRR, no murmur, no gallops, no rubs, normal peripheral pulses


Respiratory: CTAB, no wheezes, no rales, no ronchi, normal chest expansion, no 

tachypnea, normal percussion


Gastrointestinal: soft, non-tender, non-distended, normal bowel sounds, no 

palpable masses, no hepatomegaly, no splenomegaly, no bruit


Extremities: 1+ LE edema (Spaced)


Extremities - other findings: Bullae, necrosis to right lower extremity.  

Erythema, induration


Neurological: cranial nerve grossly intact, normal sensation to touch, no 

weakness, no focal deficits, no new deficit


Musculoskeletal: normal tone, normal strength, no muscle wasting


Psychiatric: normal affect, normal behavior, A&O x 3





Hospitalist Results





- Labs


Result Diagrams: 


                                 01/18/21 09:57





                                 01/18/21 09:57


Lab results: 


                                        











WBC  11.1 thou/uL (4.8-10.8)  H  01/18/21  09:57    


 


Hgb  10.7 g/dL (12.0-16.0)  L  01/18/21  09:57    


 


Hct  33.4 % (36.0-47.0)  L  01/18/21  09:57    


 


MCV  88.3 fL (78.0-98.0)   01/18/21  09:57    


 


Plt Count  336 thou/uL (130-400)   01/18/21  09:57    


 


Neutrophils %  70.8 % (42.0-75.0)   01/18/21  09:57    


 


Sodium  138 mmol/L (136-145)   01/18/21  09:57    


 


Potassium  4.3 mmol/L (3.5-5.1)   01/18/21  09:57    


 


Chloride  100 mmol/L ()   01/18/21  09:57    


 


Carbon Dioxide  25 mmol/L (22-29)   01/18/21  09:57    


 


BUN  18 mg/dL (9.8-20.1)   01/18/21  09:57    


 


Creatinine  1.49 mg/dL (0.6-1.1)  H  01/18/21  09:57    


 


Glucose  259 mg/dL ()  H  01/18/21  09:57    


 


Lactic Acid  1.3 mmol/L (0.5-2.2)   01/18/21  09:57    


 


Calcium  8.7 mg/dL (7.8-10.44)   01/18/21  09:57    


 


Total Bilirubin  0.2 mg/dL (0.2-1.2)   01/18/21  09:57    


 


AST  13 U/L (5-34)   01/18/21  09:57    


 


ALT  9 U/L (8-55)   01/18/21  09:57    


 


Alkaline Phosphatase  85 U/L ()   01/18/21  09:57    


 


Serum Total Protein  7.9 g/dL (6.0-8.3)   01/18/21  09:57    


 


Albumin  3.3 g/dL (3.5-5.0)  L  01/18/21  09:57    














Hospitalist H&P A/P





- Plan


Plan: 


51-year female with past medical history of type 2 diabetes mellitus, hyp

ertension, Charcot foot, chronic right diabetic pedal ulcer presents for new 

ulcer to right foot concerning with growing abscess.





Diabetic foot ulcer


Patient with chronic right pedal ulcer to the plantar aspect of her foot which 

is being managed by wound care.  New ulcer to the plantar aspect of her foot and

additional new ulcer to the dorsum of her foot.  On the dorsal aspect there is a

bullae which appears necrotic.  Concern for abscess versus necrotizing 

fasciitis.  Plain film with no gas but showed significant subcutaneous soft 

tissue mass and stable Charcot foot.  Patient febrile day prior to mission to 

102.  Temperature is 99.1 here.  WBC 11.1, lactic acid 1.3.  Patient received 

vancomycin and cefepime in emergency room.  Will add Flagyl for nextcoverage.  

Stat consult to general surgery.





Plan


Continue IV Vanco, cefepime, Flagyl


Stat consult to general surgery


MRI to right foot to rule out osteomyelitis


Follow wound cultures


Trend WBC, fever curve, lactic acid





Acute kidney injury


BUN/CR 18/1.49.  No history of chronic kidney disease.  Will start gentle IV 

fluids and continue to monitor.





Plan


IV fluids


Trend kidney function


Avoid nephrotoxic agents were possible


Renal dosing as appropriate





Charcot foot


History of Charcot foot.  X-ray appears stable.  Treatment as above.





Type 2 diabetes mellitus


History of type 2 diabetes mellitus on glipizide, Metformin, Levemir.  Will hold

Metformin in setting of KATTY.  We will continue home Levemir at half dose and 

glipizide.  ISS, ACH S glucose checks.





Hypertension


We will hold home lisinopril in setting of KATTY.  Monitor and treat blood 

pressures as needed.





DVT prophylaxisSQ Heparin


Full code





Case discussed with attending physician, Dr. Farias.

## 2021-01-18 NOTE — RAD
Exam:

XR Foot Rt 3 View STANDARD



HISTORY:

Diabetic foot wound.



COMPARISON:

2/25/2020 and 11/4/2019 



FINDINGS:

Prominent subcutaneous soft tissue swelling is seen about the foot along the dorsal aspect of the allison
t and along the plantar aspect of the foot. Areas of mild subcutaneous emphysema are seen along the

plantar aspect of the foot. Charcot joint involving the hindfoot is present with persistent talonavic
ular dislocation with plantar dislocation of the talus with respect to the navicular bone. There is

irregularity and foreshortening of the navicular bone which is a stable finding. Valgus deformity of 
the hindfoot is again present. No obvious acute fracture is seen, and no definite new area of

osseous destruction is appreciated. There is significant osseous irregularity of the hindfoot, this i
s a stable finding.





IMPRESSION:



1. Overall stable appearance of the foot with neuropathic/Charcot arthropathy involving the hindfoot 
with stable plantar dislocation of the talus with respect to the navicular bone.

2. Prominent subcutaneous soft tissue swelling about the foot dorsally as well as along the plantar a
spect of the foot. There are areas of minimal subcutaneous emphysema along the plantar aspect of

the foot which may correspond to patient's wound or secondary to soft tissue infection.

3. No new areas of osseous destruction are seen to definitively suggest osteomyelitis, but MRI would 
be more sensitive study of choice for evaluation of osteomyelitis.



Reported By: Jude Bustos 

Electronically Signed:  1/18/2021 10:21 AM

## 2021-01-19 NOTE — OP
DATE OF PROCEDURE:  01/18/2021



PREOPERATIVE DIAGNOSES:  Charcot's ankle foot, right; diabetic infection, abscess;

necrotizing soft tissue infection; neuropathic ulcer, heel, chronic. 



POSTOPERATIVE DIAGNOSES:  Charcot's ankle foot, right; diabetic infection, abscess;

necrotizing soft tissue infection; neuropathic ulcer, heel, chronic. 



PROCEDURE PERFORMED:  Incision and drainage with excision of devitalized skin,

subcutaneous tissue, bedside, sharply 10 blade, 5 x 2.5 cm wound, right foot dorsal

medially toward the heel tunneling to smaller wounds near the plantar arch.

Debridement of diabetic neuropathic ulcer callus, right heel. 



ANESTHESIA:  None (neuropathy). 



Note:  Patient had an MRI scan noting Charcot's changes without angie osteomyelitis.



DESCRIPTION OF PROCEDURE:  With the patient at bedside, area was prepared with

alcohol and the patient had an abscess extending from the dorsal medial right foot

on the plantar arch to the mid arch plantar.  There was necrotic skin and purulent

drainage, devitalized skin.  Alcohol prep used.  Devitalized skin and subcutaneous

tissues debrided sharply.  Devitalized subcutaneous tissue excised down to healthy

granulating bed.  Wound looked good after this.  There was a multifocal abscess

plantar that communicated with this and this bridge of skin excised as it was

devitalized.  The patient's neuropathic ulcer was without infection, more proximal

on the heel.  Debrided the callus down to healthy granulating base.  The patient

tolerated the procedure well. Gauze dressing applied.  Wound Care will care for

this.  This should heal with local wound care and should be able to go home in 36 to

48 hours on oral antibiotics.  Long-term prognosis for the foot is poor. 







Job ID:  [id number]

## 2021-01-19 NOTE — PRG
DATE OF SERVICE:  01/19/2021



Eleanor Treadwell is doing well today.  She remains afebrile.  Her foot feels somewhat

better.  Cultures revealed Streptococcus.  I have discontinued her vanc, cefepime,

and Flagyl.  I have changed her to Zosyn.  The patient can be converted to oral

antibiotics tomorrow.  She can be discharged home in the next 24 to 48 hours.  She

can follow up with outpatient wound care for wound VAC care, right foot.

Postoperatively, she should follow up with either Podiatry whom she has been seen or

Dr. Ludwig or myself in the next 3 to 4 weeks.  I would send her home with oral

Augmentin for about 10 days.  She has a neuropathic heel ulcer, Charcot foot, and

resolving abscess of right foot.  Overall, prognosis for limb salvage is poor.

There is no evidence for PAD.  Continue to follow up with Podiatry or Surgery and

outpatient wound care.  I will see her as needed, please call if necessary. 







Job ID:  [id number]

## 2021-01-19 NOTE — PDOC.HOSPP
- Subjective


Encounter Date: 01/19/21





- Objective


Vital Signs & Weight: 


                             Vital Signs (12 hours)











  Temp Pulse Resp BP Pulse Ox


 


 01/19/21 12:09  99.1 F  80  20  123/66  98


 


 01/19/21 08:13      98


 


 01/19/21 07:56  98.0 F  76  20  111/55 L  98


 


 01/19/21 05:42  98.7 F  85  18  119/81  97








                                     Weight











Admit Weight                   285 lb


 


Weight                         285 lb














I&O: 


                                        











 01/18/21 01/19/21 01/20/21





 06:59 06:59 06:59


 


Intake Total  2093 


 


Balance  2093 











Result Diagrams: 


                                 01/20/21 06:40





                                 01/20/21 06:40


Additional Labs: 


                                   Accuchecks











  01/19/21 01/18/21 01/18/21





  05:42 20:44 17:08


 


POC Glucose  139 H  123 H  217 H














Hospitalist ROS





- Medication


Medications: 


Active Medications











Generic Name Dose Route Start Last Admin





  Trade Name Freq  PRN Reason Stop Dose Admin


 


Acetaminophen  1,000 mg  01/18/21 19:28  01/19/21 05:35





  Acetaminophen 500 Mg Tab  PO   1,000 mg





  Q6H PRN   Administration





  Moderate to Severe Pain (6-10)  


 


Heparin Sodium (Porcine)  5,000 units  01/18/21 15:00  01/19/21 10:22





  Heparin 5,000 Units/Ml Vial  SC   5,000 units





  TID ALVAREZ   Administration


 


Metronidazole 500 mg/ Device  100 mls @ 100 mls/hr  01/18/21 14:00  01/19/21 

05:35





  IVPB   100 mls





  Q8HR ALVAREZ   Administration


 


Sodium Chloride  1,000 mls @ 100 mls/hr  01/18/21 12:15  01/19/21 05:35





  Normal Saline 0.9%  IV   1,000 mls





  .Q10H ALVAREZ   Administration


 


Insulin Glargine 20 units/  0.2 mls @ 0 mls/hr  01/19/21 09:00  01/19/21 10:22





  Miscellaneous Medication  SC   0.2 mls





  QAM ALVAREZ   Administration


 


Insulin Glargine 20 units/  0.2 mls @ 0 mls/hr  01/18/21 21:00  01/18/21 22:43





  Miscellaneous Medication  SC   0.2 mls





  HS ALVAREZ   Administration


 


Cefepime HCl 1 gm/ Sodium  100 mls @ 200 mls/hr  01/18/21 23:00  01/19/21 11:39





  Chloride  IVPB   100 mls





  1100,2300 ALVAREZ   Administration


 


Insulin Human Lispro  0 units  01/18/21 12:00  01/19/21 12:19





  Humalog 300 Units/3 Ml Vial  SC   2 unit





  .MILD SLIDING SCALE PRN   Administration





  Mild Correctional Scale  


 


Morphine Sulfate  2 mg  01/18/21 17:09  01/18/21 17:18





  Morphine 2 Mg/Ml Vial  IV   2 mg





  Q2H PRN   Administration





  Moderate Pain (4-6)  














- Exam


General Appearance: awake alert


ENT: normocephalic atraumatic


Respiratory: normal chest expansion, no tachypnea


Extremities: no cyanosis, no clubbing





Hosp A/P


(1) Foot abscess, right


Code(s): L02.611 - CUTANEOUS ABSCESS OF RIGHT FOOT   Status: Acute   





(2) Hypertension


Code(s): I10 - ESSENTIAL (PRIMARY) HYPERTENSION   Status: Chronic   


Qualifiers: 


   Hypertension type: essential hypertension   Qualified Code(s): I10 - 

Essential (primary) hypertension   





(3) Metabolic syndrome


Code(s): E88.81 - METABOLIC SYNDROME   Status: Acute   





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


Code(s): E11.22 - TYPE 2 DIABETES MELLITUS W DIABETIC CHRONIC KIDNEY DISEASE; 

N18.2 - CHRONIC KIDNEY DISEASE, STAGE 2 (MILD)   Status: Chronic   





(5) Charcot's joint


Code(s): M14.60 - CHARCOT'S JOINT, UNSPECIFIED SITE   Status: Chronic   





(6) Diabetes mellitus


Code(s): E11.9 - TYPE 2 DIABETES MELLITUS WITHOUT COMPLICATIONS   Status: 

Chronic   


Qualifiers: 


   Diabetes mellitus type: type 2   Diabetes mellitus long term insulin use: 

with long term use   Chronic kidney disease stage: stage 2 (mild) 





- Plan





Right foot abscess status post I&D.


Follow culture results.


Continue current broad-spectrum antibiotics and dosed per pharmacy.


No signs of active sepsis.


Continue diabetes mellitus with sliding scale.

## 2021-01-19 NOTE — CON
DATE OF CONSULTATION:  



HISTORY OF PRESENT ILLNESS:  Eleanor Treadwell is a 51-year-old morbidly obese female,

52 BMI, 5 feet 2 inches, 285 pounds, admitted today, who was sent from outpatient

wound care clinic because of failed foot diabetic treatment.  She has developed

problems in her right foot.  Plain x-rays revealed Charcot arthropathy, neuropathic

ulcer, hindfoot problems, and subcutaneous gas, but she has open wounds over the

dorsum and plantar aspect of the foot.  No new osseous destructions are noted.  The

patient was subsequently sent for MRI, 01/18/2021, today, revealing persistent

Charcot arthropathy in right hindfoot and midfoot, plantar based heel ulceration

adjacent to a dislocated talar head. Dissecting subcutaneous abscess, extending

anterior from this plantar base ulceration to the plantar medial and dorsomedial

tissue of the mid foot consistent with abscess, which is appreciated clinically.  No

definite osteomyelitis was seen.  The patient is diabetic, insulin-dependent, was

taking metformin, glipizide, lisinopril.  She has seen Dr. Ludwig in the past, who

has performed a left toe amputation, great, that healed without problems.  She has

palpable pedal pulses. 



ALLERGIES:  LATEX.



SOCIAL HISTORY:  Tobacco, none. Alcohol, none.



MEDICATIONS:  At home;

1. Insulin.

2. Metformin.

3. Glipizide.

4. Lisinopril.



PAST SURGICAL HISTORY:  Bilateral tubal ligation, laparoscopic cholecystectomy, left

great toe amputation.  She has been seen by Dr. Carroll and Dr. Owens, who have

performed operations.  Dr. Owens saw her on 08/13/2019, for infection of right

foot.  Dr. Carroll saw her on November 5, 2019 for similar infection in right foot. 



PHYSICAL EXAMINATION:

VITAL SIGNS:  Height 5 foot 2 inches, weight 285 pounds, 52 BMI. Temperature 99.1

degrees, pulse 87, blood pressure 137/63. 

HEAD, EARS, EYES, NOSE AND THROAT:  Unremarkable. 

LUNGS:  Clear to auscultation. 

CARDIAC: Regular rate and rhythm without murmur or gallop. 

ABDOMEN:  Soft, obese, nontender. 

EXTREMITIES:  Palpable femoral, popliteal, dorsalis pedis, posterior tibial pulses

bilaterally.  Left great toe amputation stump well healed.  Gross changes of Charcot

foot, right with arthropathy.  She has a significant abscess extending from the

plantar foot to the dorsum of the foot with abscess over the dorsum of the foot and

multiple punctate openings with purulent drainage over the arch.  Just proximal to

this, there is a large neuropathic ulceration with callus undermining and then with

the surrounding normal tissue without infection. 



ASSESSMENT AND PLAN:  

1. Severe diabetic foot infection, right foot.  Her white count is 11 and hemoglobin

10.7.  We would recommend bedside debridement, intravenous antibiotics.  She is at

risk for amputation. 

2. Chronic kidney disease.

3. Obesity, morbid.

4. Metabolic syndrome.

5. Hypertension.

6. Diabetes mellitus.







Job ID:  [id number]

## 2021-01-20 NOTE — PDOC.DS.DS
Provider





- Provider


Date of Admission: 


01/18/21 15:53





Date of Discharge: 01/20/21


Admitting Provider: 


Ja Farias MD





Primary Care Physician: 


Unknown








Course





- Hospital Course


Hospital Course: 


The patient is a 51-year-old female with past medical history of diabetes roddy

litus, hypertension, chronic diabetic foot ulcer, and chocolate joint who was 

admitted to the hospital for management of worsening right foot infection.  MRI 

of the foot revealed evidence of abscess without osteomyelitis.  Patient 

underwent successful I&D.  Wound culture showed growth of Streptococcus 

agalactiae.  The patient was nonseptic on discharge.  She received antibiotics 

for the next 10 days.


Resuscitation Status: 








01/18/21 12:00


Resuscitation Status Routine 


   Co-Sign Provider: 


   Resuscitation Status: FULL: Full Resuscitation











- Labs


Lab Results: 


                                        





                                 01/20/21 06:40 





                                 01/20/21 06:40 





Abnormal Lab Results - Last 48 hrs





01/19/21 07:15: Calcium 7.7 L


01/19/21 07:15: RBC 3.02 L, Hgb 8.8 L, Hct 27.2 L, RDW 11.3 L, MPV 6.9 L, 

Monocytes % 12.7 H, Monocytes # 0.9 H


01/20/21 06:40: Chloride 108 H, Carbon Dioxide 21 L


01/20/21 06:40: RBC 3.12 L, Hgb 8.9 L, Hct 28.1 L, MCHC 31.8 L, MPV 6.8 L, 

Monocytes % 11.2 H, Monocytes # 0.8 H





Microbiology - Entire Visit





01/18/21 09:45   Foot - Pending   Bacterial Culture - Preliminary


                            Streptococcus agalactiae Gp. B


01/18/21 10:02   Venous blood - Right Arm   Blood Culture - Preliminary


                            Specimen has been received and culture in progress.


                            No Growth to date.


01/18/21 09:57   Venous blood - Left Arm   Blood Culture - Preliminary


                            Specimen has been received and culture in progress.


                            No Growth to date.











- Physical Exam


Vitals: 


                             Vital Signs (12 hours)











  Temp Pulse Resp BP Pulse Ox


 


 01/20/21 08:08  98.5 F  81  20  144/65 H  95


 


 01/20/21 04:24  97.9 F  74  20  135/63  96


 


 01/20/21 00:40  98.7 F  77  16  141/64 H  95








                                     Weight











Admit Weight                   285 lb


 


Weight                         285 lb














Physical Exam: 


The patient was seen and examined on the day of discharge.








Problem





- Problem


(1) Foot abscess, right


Code(s): L02.611 - CUTANEOUS ABSCESS OF RIGHT FOOT   Status: Acute   





(2) Hypertension


Code(s): I10 - ESSENTIAL (PRIMARY) HYPERTENSION   Status: Chronic   


Qualifiers: 


   Hypertension type: essential hypertension   Qualified Code(s): I10 - 

Essential (primary) hypertension   





(3) Metabolic syndrome


Code(s): E88.81 - METABOLIC SYNDROME   Status: Acute   





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


Code(s): E11.22 - TYPE 2 DIABETES MELLITUS W DIABETIC CHRONIC KIDNEY DISEASE; 

N18.2 - CHRONIC KIDNEY DISEASE, STAGE 2 (MILD)   Status: Chronic   





(5) Charcot's joint


Code(s): M14.60 - CHARCOT'S JOINT, UNSPECIFIED SITE   Status: Chronic   





(6) Diabetes mellitus


Code(s): E11.9 - TYPE 2 DIABETES MELLITUS WITHOUT COMPLICATIONS   Status: 

Chronic   


Qualifiers: 


   Diabetes mellitus type: type 2   Diabetes mellitus long term insulin use: 

with long term use   Chronic kidney disease stage: stage 2 (mild) 





Plan





- Discharge Medications


Prescriptions: 


Clindamycin [Cleocin] 300 mg PO Q8HR #60 cap


Lactobacillus [Floranex] 1 tab PO BID-AC #20 tab


Home Medications: 


                                        











 Medication  Instructions  Recorded  Confirmed  Type


 


glipiZIDE [Glucotrol] 10 mg PO BID- 12/02/14 01/18/21 History


 


metFORMIN HCl 500 mg PO BID- 12/02/14 01/18/21 History


 


Insulin Detemir [Levemir] 35 units SQ BID 08/11/19 01/18/21 History


 


Lisinopril [Zestril] 10 mg PO DAILY 08/11/19 01/18/21 History


 


Clindamycin [Cleocin] 300 mg PO Q8HR #60 cap 01/20/21  Rx


 


Lactobacillus [Floranex] 1 tab PO BID-AC #20 tab 01/20/21  Rx











Allergies: 








latex Allergy (Mild, Verified 01/18/21 15:27)


   Hives








- Follow up Plan


Referrals: 


Pepe San MD [Active] - 3-4 Weeks (call for appointment if not already 

made. 3-4 weeks. )


Disposition: HOME





Quality





- Care Measures


CORE MEASURES:: N/A

## 2021-01-22 NOTE — PQF
CLINICAL DOCUMENTATION CLARIFICATION FORM: 

Dear : Pepe San          Date / Time: 01/22/21 0318

Please exercise your independent, professional judgment in responding to the 
clarification form. 

Clinical indicators are provided on the bottom of this form for your review



In your clinical opinion based on clinical findings below can you please 
identify the depth of Excisional debridement of the following site:



Please check appropriate box(es):



 Right dorsal foot

[  ] Skin   [  ] Subcutaneous     [  ] Fascia      [  ] Muscle     [  ] Tendon  
  [  ] Bone



 Right  heel 

[  ] Skin   [  ] Subcutaneous     [  ] Fascia      [  ] Muscle     [  ] Tendon  
  [  ] Bone



[  ] Other procedure, please specify ___________ 

[  ] Unable to determine

Physician Signature:                Date/Time:



For continuity of documentation, please document condition throughout progress 
notes and discharge summary.  Thank You.



To be completed by CDI/Coding staff for physician review: 







 Present Clinical Indicators - Signs / Symptoms / Labs Results and Location in 

Medical Record

 

[x] Pt had an abscess extending from the dorsal medial right foot on th eplntar
 arch to the mid arch plantar Operative report 01/18 Dr San

 

[x] Devitalized skin and subcutaneous tissue debirded sharply. Devitalized 
subcutaneous tissue excised down to healthy granulating bed Operative report 
01/18 Dr San

 

[x] The pt neuropathic ulcer was without infection, more proximal on the heel. 
Debrided the callus down to healthy granulating base Operative report 01/18 Dr San

 

Present Risk Factors                                                            
              Results and Location in Medical Record

 

[x] Charots ankle foot right Operative report 01/18 Dr San

 

[x] Diabetic infection, abscess Operative report 01/18 Dr aSn

 

[x] Neuropathic ulcer, heel Operative report 01/18 Dr San

 

[x] Morbid Obesity Consult 01/19

 

Present Treatments                                                              
                Results and Location in Medical Record

 

[x] Incision and drainage with excision of right root dorsal Operative report 
01/18 Dr San

 

[x] Debrdement of right heel

 Operative report 01/18 Dr San





  CDS/ Signature: Roxanne Mondragon      Phone #: [phone] ext [phone]    Date/Time: 01/22/21 0318



                 This is a permanent part of the Medical Record

[facility]